# Patient Record
Sex: FEMALE | Race: BLACK OR AFRICAN AMERICAN | NOT HISPANIC OR LATINO | Employment: OTHER | URBAN - METROPOLITAN AREA
[De-identification: names, ages, dates, MRNs, and addresses within clinical notes are randomized per-mention and may not be internally consistent; named-entity substitution may affect disease eponyms.]

---

## 2017-03-27 ENCOUNTER — TELEPHONE (OUTPATIENT)
Dept: ENDOCRINOLOGY | Facility: CLINIC | Age: 67
End: 2017-03-27

## 2017-03-27 DIAGNOSIS — E05.90 SUBCLINICAL HYPERTHYROIDISM: Primary | ICD-10-CM

## 2017-03-27 NOTE — TELEPHONE ENCOUNTER
----- Message from Bobbi Krause sent at 3/24/2017 12:14 PM CDT -----  Contact: Abrial with [facility]  Pt received a recall letter to schedule an appt in April    Contact number [phone] ext  [phone]  Thanks

## 2017-03-27 NOTE — TELEPHONE ENCOUNTER
No labs were done so can you go ahead and put in EXTERNAL lab orders again and I will fax to Heath at fax [phone] and schedule follow up when August schedules open

## 2017-03-27 NOTE — TELEPHONE ENCOUNTER
Forwarded to Dr Devries to see if pt needed April or if she could wait til next available in August and just do labs now.  If she needs April follow up, can we book extra at 2:30 on a Tuesday or Thursday?

## 2017-04-23 ENCOUNTER — TELEPHONE (OUTPATIENT)
Dept: ENDOCRINOLOGY | Facility: CLINIC | Age: 67
End: 2017-04-23

## 2017-04-23 DIAGNOSIS — E05.90 SUBCLINICAL HYPERTHYROIDISM: Primary | ICD-10-CM

## 2017-04-23 NOTE — TELEPHONE ENCOUNTER
Reviewed outside labs. Still with evidence of subclinical hyperthyroidism. Needs NM thyroid scan and uptake for diagnosis.

## 2017-04-24 NOTE — TELEPHONE ENCOUNTER
Nursing home notified of this.  Will schedule and mail to them and they will arrange transportation

## 2017-05-09 ENCOUNTER — TELEPHONE (OUTPATIENT)
Dept: RADIOLOGY | Facility: HOSPITAL | Age: 67
End: 2017-05-09

## 2017-05-10 ENCOUNTER — HOSPITAL ENCOUNTER (OUTPATIENT)
Dept: RADIOLOGY | Facility: HOSPITAL | Age: 67
Discharge: HOME | End: 2017-05-10
Attending: INTERNAL MEDICINE
Payer: MEDICARE

## 2017-05-10 DIAGNOSIS — E05.90 SUBCLINICAL HYPERTHYROIDISM: ICD-10-CM

## 2017-05-10 PROCEDURE — 78014 THYROID IMAGING W/BLOOD FLOW: CPT | Mod: 26,,, | Performed by: RADIOLOGY

## 2017-05-11 ENCOUNTER — HOSPITAL ENCOUNTER (OUTPATIENT)
Dept: RADIOLOGY | Facility: HOSPITAL | Age: 67
Discharge: HOME | End: 2017-05-11
Attending: INTERNAL MEDICINE
Payer: MEDICARE

## 2017-05-11 PROCEDURE — A9512 TC99M PERTECHNETATE: HCPCS

## 2017-05-25 ENCOUNTER — TELEPHONE (OUTPATIENT)
Dept: ENDOCRINOLOGY | Facility: CLINIC | Age: 67
End: 2017-05-25

## 2017-05-25 DIAGNOSIS — E05.90 SUBCLINICAL HYPERTHYROIDISM: Primary | ICD-10-CM

## 2017-05-25 NOTE — TELEPHONE ENCOUNTER
Reviewed uptake and scan. Patient had normal thyroid uptake, but appears to have cold nodules bilaterally. Needs thyroid ultrasound.

## 2017-07-05 ENCOUNTER — HOSPITAL ENCOUNTER (OUTPATIENT)
Dept: ENDOCRINOLOGY | Facility: CLINIC | Age: 67
Discharge: HOME | End: 2017-07-05
Attending: INTERNAL MEDICINE
Payer: MEDICARE

## 2017-07-05 DIAGNOSIS — E05.90 SUBCLINICAL HYPERTHYROIDISM: ICD-10-CM

## 2017-07-05 PROCEDURE — 76536 US EXAM OF HEAD AND NECK: CPT | Mod: ,,, | Performed by: INTERNAL MEDICINE

## 2017-07-12 ENCOUNTER — TELEPHONE (OUTPATIENT)
Dept: ENDOCRINOLOGY | Facility: CLINIC | Age: 67
End: 2017-07-12

## 2017-07-12 DIAGNOSIS — E05.90 SUBCLINICAL HYPERTHYROIDISM: Primary | ICD-10-CM

## 2017-07-12 RX ORDER — METHIMAZOLE 5 MG/1
5 TABLET ORAL DAILY
Qty: 30 TABLET | Refills: 11 | Status: SHIPPED | OUTPATIENT
Start: 2017-07-12 | End: 2017-08-04 | Stop reason: SDUPTHER

## 2017-07-12 NOTE — TELEPHONE ENCOUNTER
Reviewed ultrasound, NM thyroid scan and labs. Patient with subclinical hyperthyroidism. No nodules meeting criteria for FNA so will go ahead and start methimazole 5 mg PO daily and repeat TSH in 6 weeks.

## 2017-08-04 ENCOUNTER — OFFICE VISIT (OUTPATIENT)
Dept: ENDOCRINOLOGY | Facility: CLINIC | Age: 67
End: 2017-08-04
Payer: MEDICARE

## 2017-08-04 VITALS — DIASTOLIC BLOOD PRESSURE: 91 MMHG | SYSTOLIC BLOOD PRESSURE: 188 MMHG | RESPIRATION RATE: 16 BRPM | HEART RATE: 55 BPM

## 2017-08-04 DIAGNOSIS — E04.2 MULTINODULAR GOITER: ICD-10-CM

## 2017-08-04 DIAGNOSIS — E11.69 TYPE 2 DIABETES MELLITUS WITH OTHER SPECIFIED COMPLICATION: ICD-10-CM

## 2017-08-04 DIAGNOSIS — E78.5 HYPERLIPIDEMIA, UNSPECIFIED HYPERLIPIDEMIA TYPE: ICD-10-CM

## 2017-08-04 DIAGNOSIS — I10 ESSENTIAL HYPERTENSION: ICD-10-CM

## 2017-08-04 DIAGNOSIS — E05.90 SUBCLINICAL HYPERTHYROIDISM: Primary | ICD-10-CM

## 2017-08-04 PROCEDURE — 99213 OFFICE O/P EST LOW 20 MIN: CPT | Mod: PBBFAC | Performed by: INTERNAL MEDICINE

## 2017-08-04 PROCEDURE — 99214 OFFICE O/P EST MOD 30 MIN: CPT | Mod: S$PBB,,, | Performed by: INTERNAL MEDICINE

## 2017-08-04 PROCEDURE — 1159F MED LIST DOCD IN RCRD: CPT | Mod: ,,, | Performed by: INTERNAL MEDICINE

## 2017-08-04 PROCEDURE — 99999 PR PBB SHADOW E&M-EST. PATIENT-LVL III: CPT | Mod: PBBFAC,,, | Performed by: INTERNAL MEDICINE

## 2017-08-04 RX ORDER — METHIMAZOLE 5 MG/1
5 TABLET ORAL DAILY
Qty: 30 TABLET | Refills: 11 | Status: SHIPPED | OUTPATIENT
Start: 2017-08-04 | End: 2018-08-04

## 2017-08-04 NOTE — PROGRESS NOTES
Subjective:      Patient ID: Thomas Mckenna is a 67 y.o. female.    Chief Complaint:  Hyperthyroidism      History of Present Illness  Ms. Mckenna presents from [facility] for follow up of subclinical hyperthyroidism and multinodular goiter.      Has active history of vascular dementia, type 2 diabetes, HTN, HLP, COPD, and CAD. Has history of stroke.     History is limited as patient has dementia and gives very limited answers to my questions. Was found to have fully suppressed TSH with normal total T4 and T3 uptake on 6/30/2016. She denies any history of thyroid disease or taking medication for the thyroid. She denies palpitations, loose stools, heat intolerance, tremors or feeling anxious. Unsure about family history of thyroid disease.     Had NM thyroid scan and uptake that showed normal uptake. Then underwent ultrasound which showed multiple nodules with a benign spongiform apperance.    Given comorbididties it was decided to start her on methimazole for subclinical hyperthyroidism, but I do not see the methimazole on her med list from the NH today.     Also has history of type 2 diabetes on Tradjenta and 70/30 insulin 56 unit(s) at night and 27 units in morning.      Has HTN and bp markedly elevated initially but came down to 145/80 on repeat with manual cuff.    NM Thyroid scan and uptake:  Patient is 5.2 mCi of technetium 99m labeled pertechnetate intravenously.  Images demonstrate possible cold nodules bilaterally.   Impression      24-hour uptake 9.4%.  Recommended treatment dose 32 mCi iodine-131 orally.    Possible bilateral cold nodules.  Ultrasound recommended.     Thyroid ultrasound:    1.) Thyroid gland is enlarged with heterogeneous echotexture    2.) 1.42 x 1.21 x 1.13 cm solid heterogeneous, predominately isoechoic nodule seen in the right inferior pole with a spongiform appearance    3.) 2.66 x 1.94 x 2.18 cm solid heterogeneous, predominately hypoechoic nodule seen in the right mid pole  with a spongiform appearance    4.) 1.69 x 1.17 x 1.22 cm solid heterogeneous, predominately hypoechoic nodule seen in the left inferior pole with a spongiform appearance    RECOMMENDATIONS:   Recommend repeat thyroid ultrasound in 1-2 years.    Review of Systems   Constitutional: Negative for chills and fever.   Gastrointestinal: Negative for nausea.     Objective:   Physical Exam   Nursing note and vitals reviewed.  Thyromegaly  No tachycardia  Heart murmur    Lab Review:   No recent labs available for review    Assessment:     1. Subclinical hyperthyroidism    2. Type 2 diabetes mellitus with other specified complication    3. Essential hypertension    4. Hyperlipidemia, unspecified hyperlipidemia type    5. Multinodular goiter      Plan:        1.) Patient has subclinical hyperthyroidism biochemically  --Seems to be euthyroid by clinically but will start methimazole 5 mg PO daily given comorbid condiations  --Will check TSH, FT4, total T3, CMP and CBC in 6 weeks after starting methimazole     2.) No glucose logs or A1c available today  --Continue current regimen as per NH     3.) Bp slightly elevated on repeat bp with manual cuff  --Continue current regimen, managed by PCP     4.) On statin    5.) No nodule meeting criteria for FNA  --Will repeat thyroid ultrasound in 1 year    RTC in 1 year with thyroid ultrasound. Labs in 6 weeks at NH.     Arsen Devries M.D. Staff Endocrinology